# Patient Record
(demographics unavailable — no encounter records)

---

## 2025-01-02 NOTE — HISTORY OF PRESENT ILLNESS
[FreeTextEntry1] : for establish care and CPE [de-identified] : 21 year female came for establish care and CPE Pt has h/o Anemia, Obesity, overall feels well, denies fever, headache, chest pain, palpitations, shortness of breath.

## 2025-01-02 NOTE — HEALTH RISK ASSESSMENT
[Good] : ~his/her~  mood as  good [No] : No [0] : 2) Feeling down, depressed, or hopeless: Not at all (0) [HIV Test offered] : HIV Test offered [Hepatitis C test offered] : Hepatitis C test offered [Former] : Former [0-4] : 0-4 [< 15 Years] : < 15 Years [TGA2Usoci] : 0

## 2025-05-12 NOTE — HISTORY OF PRESENT ILLNESS
[FreeTextEntry1] : 20 y/o  presents for annual GYN exam.  SAB (early) x 1.  Lives with partner of two years.   Sexually active with partner without any issues and uses withdrawal. Would be okay with a pregnancy.  Monthly, heavier the first two days and lasts about 5-6 days. Has dysmenorrhea  Overall good health  Gardasil vaccine as a young teen.  MGGM ovarian cancer.  Denies FH of colon, breast or uterine cancer.  .

## 2025-05-12 NOTE — PHYSICAL EXAM
[Chaperone Declined] : Chaperone offered however refused by patient, [Appropriately responsive] : appropriately responsive [Alert] : alert [No Acute Distress] : no acute distress [No Lymphadenopathy] : no lymphadenopathy [Soft] : soft [Non-tender] : non-tender [Non-distended] : non-distended [No HSM] : No HSM [No Lesions] : no lesions [No Mass] : no mass [Oriented x3] : oriented x3 [Examination Of The Breasts] : a normal appearance [No Discharge] : no discharge [No Masses] : no breast masses were palpable [Labia Majora] : normal [Labia Minora] : normal [Pink Rugae] : pink rugae [Normal] : normal [Uterine Adnexae] : normal [FreeTextEntry6] : no bilateral axillary LAD [FreeTextEntry5] : no CMT

## 2025-05-12 NOTE — PLAN
[FreeTextEntry1] : -Will start patient on OCPs to manage dysmenorrhea and heavy periods.  -Not actively seeking a pregnancy at this time.  -Correct pill use discussed. Begin with first day of next menstrual period.  -Side effects and danger signs reviewed.  -Efficacy in pregnancy protection discussed  -Pill does not protect against STD transmission.  -Potential interaction with medications discussed.  -All questions answered.